# Patient Record
Sex: MALE | Race: WHITE | Employment: UNEMPLOYED | URBAN - NONMETROPOLITAN AREA
[De-identification: names, ages, dates, MRNs, and addresses within clinical notes are randomized per-mention and may not be internally consistent; named-entity substitution may affect disease eponyms.]

---

## 2021-04-19 ENCOUNTER — APPOINTMENT (OUTPATIENT)
Dept: CT IMAGING | Facility: HOSPITAL | Age: 46
End: 2021-04-19
Attending: PHYSICIAN ASSISTANT
Payer: MEDICARE

## 2021-04-19 ENCOUNTER — HOSPITAL ENCOUNTER (EMERGENCY)
Facility: HOSPITAL | Age: 46
Discharge: HOME | End: 2021-04-19
Attending: PHYSICIAN ASSISTANT | Admitting: PHYSICIAN ASSISTANT
Payer: MEDICARE

## 2021-04-19 VITALS
RESPIRATION RATE: 16 BRPM | SYSTOLIC BLOOD PRESSURE: 127 MMHG | HEART RATE: 68 BPM | OXYGEN SATURATION: 97 % | DIASTOLIC BLOOD PRESSURE: 81 MMHG | TEMPERATURE: 98.5 F

## 2021-04-19 DIAGNOSIS — N20.1 URETERAL STONE: Primary | ICD-10-CM

## 2021-04-19 LAB
ALBUMIN SERPL-MCNC: 3.8 G/DL (ref 3.4–5)
ALBUMIN UR-MCNC: 10 MG/DL
ALP SERPL-CCNC: 73 U/L (ref 40–150)
ALT SERPL W P-5'-P-CCNC: 62 U/L (ref 0–70)
ANION GAP SERPL CALCULATED.3IONS-SCNC: 8 MMOL/L (ref 3–14)
APPEARANCE UR: CLEAR
AST SERPL W P-5'-P-CCNC: 21 U/L (ref 0–45)
BACTERIA #/AREA URNS HPF: ABNORMAL /HPF
BASOPHILS # BLD AUTO: 0 10E9/L (ref 0–0.2)
BASOPHILS NFR BLD AUTO: 0.4 %
BILIRUB SERPL-MCNC: 0.6 MG/DL (ref 0.2–1.3)
BILIRUB UR QL STRIP: NEGATIVE
BUN SERPL-MCNC: 17 MG/DL (ref 7–30)
CALCIUM SERPL-MCNC: 8.5 MG/DL (ref 8.5–10.1)
CAOX CRY #/AREA URNS HPF: ABNORMAL /HPF
CHLORIDE SERPL-SCNC: 106 MMOL/L (ref 94–109)
CO2 SERPL-SCNC: 22 MMOL/L (ref 20–32)
COLOR UR AUTO: YELLOW
CREAT SERPL-MCNC: 0.66 MG/DL (ref 0.66–1.25)
DIFFERENTIAL METHOD BLD: NORMAL
EOSINOPHIL # BLD AUTO: 0.1 10E9/L (ref 0–0.7)
EOSINOPHIL NFR BLD AUTO: 1.2 %
ERYTHROCYTE [DISTWIDTH] IN BLOOD BY AUTOMATED COUNT: 12.6 % (ref 10–15)
GFR SERPL CREATININE-BSD FRML MDRD: >90 ML/MIN/{1.73_M2}
GLUCOSE SERPL-MCNC: 193 MG/DL (ref 70–99)
GLUCOSE UR STRIP-MCNC: 300 MG/DL
HCT VFR BLD AUTO: 49.5 % (ref 40–53)
HGB BLD-MCNC: 17.2 G/DL (ref 13.3–17.7)
HGB UR QL STRIP: ABNORMAL
IMM GRANULOCYTES # BLD: 0 10E9/L (ref 0–0.4)
IMM GRANULOCYTES NFR BLD: 0.3 %
KETONES UR STRIP-MCNC: 5 MG/DL
LEUKOCYTE ESTERASE UR QL STRIP: NEGATIVE
LYMPHOCYTES # BLD AUTO: 2.1 10E9/L (ref 0.8–5.3)
LYMPHOCYTES NFR BLD AUTO: 22.3 %
MCH RBC QN AUTO: 29.9 PG (ref 26.5–33)
MCHC RBC AUTO-ENTMCNC: 34.7 G/DL (ref 31.5–36.5)
MCV RBC AUTO: 86 FL (ref 78–100)
MONOCYTES # BLD AUTO: 0.7 10E9/L (ref 0–1.3)
MONOCYTES NFR BLD AUTO: 7 %
MUCOUS THREADS #/AREA URNS LPF: PRESENT /LPF
NEUTROPHILS # BLD AUTO: 6.3 10E9/L (ref 1.6–8.3)
NEUTROPHILS NFR BLD AUTO: 68.8 %
NITRATE UR QL: NEGATIVE
NRBC # BLD AUTO: 0 10*3/UL
NRBC BLD AUTO-RTO: 0 /100
PH UR STRIP: 5.5 PH (ref 4.7–8)
PLATELET # BLD AUTO: 261 10E9/L (ref 150–450)
POTASSIUM SERPL-SCNC: 4.2 MMOL/L (ref 3.4–5.3)
PROT SERPL-MCNC: 7.7 G/DL (ref 6.8–8.8)
RBC # BLD AUTO: 5.76 10E12/L (ref 4.4–5.9)
RBC #/AREA URNS AUTO: 20 /HPF (ref 0–2)
SODIUM SERPL-SCNC: 136 MMOL/L (ref 133–144)
SOURCE: ABNORMAL
SP GR UR STRIP: 1.03 (ref 1–1.03)
SQUAMOUS #/AREA URNS AUTO: 0 /HPF (ref 0–1)
UROBILINOGEN UR STRIP-MCNC: 2 MG/DL (ref 0–2)
WBC # BLD AUTO: 9.2 10E9/L (ref 4–11)
WBC #/AREA URNS AUTO: 3 /HPF (ref 0–5)

## 2021-04-19 PROCEDURE — 99284 EMERGENCY DEPT VISIT MOD MDM: CPT | Performed by: PHYSICIAN ASSISTANT

## 2021-04-19 PROCEDURE — 96372 THER/PROPH/DIAG INJ SC/IM: CPT | Performed by: PHYSICIAN ASSISTANT

## 2021-04-19 PROCEDURE — 36415 COLL VENOUS BLD VENIPUNCTURE: CPT | Performed by: PHYSICIAN ASSISTANT

## 2021-04-19 PROCEDURE — 80053 COMPREHEN METABOLIC PANEL: CPT | Performed by: PHYSICIAN ASSISTANT

## 2021-04-19 PROCEDURE — 74176 CT ABD & PELVIS W/O CONTRAST: CPT

## 2021-04-19 PROCEDURE — 250N000013 HC RX MED GY IP 250 OP 250 PS 637: Performed by: PHYSICIAN ASSISTANT

## 2021-04-19 PROCEDURE — 99285 EMERGENCY DEPT VISIT HI MDM: CPT | Mod: 25

## 2021-04-19 PROCEDURE — 250N000011 HC RX IP 250 OP 636: Performed by: PHYSICIAN ASSISTANT

## 2021-04-19 PROCEDURE — 81001 URINALYSIS AUTO W/SCOPE: CPT | Performed by: PHYSICIAN ASSISTANT

## 2021-04-19 PROCEDURE — 85025 COMPLETE CBC W/AUTO DIFF WBC: CPT | Performed by: PHYSICIAN ASSISTANT

## 2021-04-19 RX ORDER — ICOSAPENT ETHYL 1 G/1
2 CAPSULE ORAL
COMMUNITY
Start: 2021-01-13 | End: 2021-06-15

## 2021-04-19 RX ORDER — IBUPROFEN 800 MG/1
800 TABLET, FILM COATED ORAL
COMMUNITY
Start: 2021-03-13 | End: 2021-06-15

## 2021-04-19 RX ORDER — OXYCODONE AND ACETAMINOPHEN 5; 325 MG/1; MG/1
1 TABLET ORAL EVERY 6 HOURS PRN
Qty: 12 TABLET | Refills: 0 | Status: SHIPPED | OUTPATIENT
Start: 2021-04-19

## 2021-04-19 RX ORDER — SIMVASTATIN 80 MG
80 TABLET ORAL
COMMUNITY
Start: 2021-01-05

## 2021-04-19 RX ORDER — OXYCODONE AND ACETAMINOPHEN 5; 325 MG/1; MG/1
1 TABLET ORAL ONCE
Status: COMPLETED | OUTPATIENT
Start: 2021-04-19 | End: 2021-04-19

## 2021-04-19 RX ORDER — METFORMIN HCL 500 MG
TABLET, EXTENDED RELEASE 24 HR ORAL
COMMUNITY
Start: 2021-03-28

## 2021-04-19 RX ORDER — KETOROLAC TROMETHAMINE 30 MG/ML
60 INJECTION, SOLUTION INTRAMUSCULAR; INTRAVENOUS ONCE
Status: COMPLETED | OUTPATIENT
Start: 2021-04-19 | End: 2021-04-19

## 2021-04-19 RX ORDER — FEXOFENADINE HCL 180 MG/1
180 TABLET ORAL
COMMUNITY
Start: 2020-01-21

## 2021-04-19 RX ORDER — KETOCONAZOLE 20 MG/ML
SHAMPOO TOPICAL
COMMUNITY
Start: 2020-05-12

## 2021-04-19 RX ORDER — NAPROXEN 500 MG/1
500 TABLET ORAL 2 TIMES DAILY PRN
COMMUNITY
Start: 2021-04-01

## 2021-04-19 RX ORDER — MONTELUKAST SODIUM 10 MG/1
TABLET ORAL
COMMUNITY
Start: 2020-11-11

## 2021-04-19 RX ORDER — DEXTROAMPHETAMINE SACCHARATE, AMPHETAMINE ASPARTATE MONOHYDRATE, DEXTROAMPHETAMINE SULFATE AND AMPHETAMINE SULFATE 5; 5; 5; 5 MG/1; MG/1; MG/1; MG/1
20 CAPSULE, EXTENDED RELEASE ORAL
COMMUNITY
Start: 2021-04-16 | End: 2021-07-15

## 2021-04-19 RX ORDER — FLUTICASONE PROPIONATE 50 MCG
SPRAY, SUSPENSION (ML) NASAL
COMMUNITY
Start: 2021-03-28

## 2021-04-19 RX ORDER — LORAZEPAM 0.5 MG/1
0.5 TABLET ORAL
COMMUNITY
Start: 2021-03-15

## 2021-04-19 RX ADMIN — KETOROLAC TROMETHAMINE 60 MG: 30 INJECTION, SOLUTION INTRAMUSCULAR at 21:16

## 2021-04-19 RX ADMIN — OXYCODONE HYDROCHLORIDE AND ACETAMINOPHEN 1 TABLET: 5; 325 TABLET ORAL at 21:15

## 2021-04-19 ASSESSMENT — ENCOUNTER SYMPTOMS
APPETITE CHANGE: 0
SHORTNESS OF BREATH: 0
ABDOMINAL PAIN: 1
VOMITING: 0
HEMATURIA: 1
FLANK PAIN: 1
NAUSEA: 1
CHILLS: 0
BACK PAIN: 0
FEVER: 0
ACTIVITY CHANGE: 0
BRUISES/BLEEDS EASILY: 0

## 2021-04-20 NOTE — DISCHARGE INSTRUCTIONS
Pain medications as prescribed.    Increase your fluids.    Strain your urine.    Follow-up with urology.  I have placed a referral to see Dr. Moon in [place].  You can expect a call from his office in the next few days.    Return here for any fevers, intractable vomiting, intractable pain, or other questions or concerns.

## 2021-04-20 NOTE — ED PROVIDER NOTES
History     Chief Complaint   Patient presents with     Flank Pain     The history is provided by the patient.     Cristian Murrell is a 46 year old male who presented to the emergency department for evaluation of left flank pain.  Symptoms began a few hours ago.  Pain is described as a 9 and colicky.  Denies any nausea, vomiting, hematemesis, hematochezia, or fevers.  Does report hematuria.  Denies any falls.  Denies any blood thinner usage.    Allergies:  Allergies   Allergen Reactions     Fd&C Yellow #10-Methylphenidate      Methylphenidate Other (See Comments)     Other reaction(s): *Unknown         Problem List:    There are no active problems to display for this patient.       Past Medical History:    Past Medical History:   Diagnosis Date     Herpes zoster      Herpes zoster      Keloid scar        Past Surgical History:    Past Surgical History:   Procedure Laterality Date     ARTHROTOMY WRIST      Left wrist     OTHER SURGICAL HISTORY      03/30/2001,[id number],OTHER       Family History:    Family History   Problem Relation Age of Onset     Hyperlipidemia Other         Hyperlipidemia,FHx  Elevated cholesterol     Diabetes Other         Diabetes,FHx DM       Social History:  Marital Status:  Single [1]  Social History     Tobacco Use     Smoking status: Former Smoker     Smokeless tobacco: Never Used   Substance Use Topics     Alcohol use: Yes     Alcohol/week: 0.0 standard drinks     Comment: Alcoholic Drinks/day: occ     Drug use: Unknown     Types: Other     Comment: Drug use: No        Medications:    amphetamine-dextroamphetamine (ADDERALL XR) 20 MG 24 hr capsule  fexofenadine (ALLEGRA) 180 MG tablet  fluticasone (FLONASE) 50 MCG/ACT nasal spray  ibuprofen (ADVIL/MOTRIN) 800 MG tablet  Icosapent Ethyl 1 g CAPS  ketoconazole (NIZORAL) 2 % external shampoo  LORazepam (ATIVAN) 0.5 MG tablet  metFORMIN (GLUCOPHAGE-XR) 500 MG 24 hr tablet  montelukast (SINGULAIR) 10 MG tablet  naproxen (NAPROSYN) 500 MG  tablet  oxyCODONE-acetaminophen (PERCOCET) 5-325 MG tablet  simvastatin (ZOCOR) 80 MG tablet          Review of Systems   Constitutional: Negative for activity change, appetite change, chills and fever.   Respiratory: Negative for shortness of breath.    Cardiovascular: Negative for chest pain.   Gastrointestinal: Positive for abdominal pain and nausea. Negative for vomiting.   Genitourinary: Positive for flank pain and hematuria. Negative for testicular pain.   Musculoskeletal: Negative for back pain.   Skin: Negative.    Allergic/Immunologic: Negative for immunocompromised state.   Hematological: Does not bruise/bleed easily.       Physical Exam   BP: 138/93  Pulse: 82  Temp: 98  F (36.7  C)  Resp: 16  SpO2: 96 %      Physical Exam  Vitals signs and nursing note reviewed.   Constitutional:       General: He is not in acute distress.     Appearance: Normal appearance. He is not ill-appearing, toxic-appearing or diaphoretic.      Comments: Pleasant and talkative 46-year-old male found seated upright on the exam bed in no distress.   Cardiovascular:      Rate and Rhythm: Normal rate and regular rhythm.   Pulmonary:      Effort: Pulmonary effort is normal.   Abdominal:      General: There is no distension.      Palpations: Abdomen is soft.      Tenderness: There is no abdominal tenderness.   Skin:     General: Skin is warm and dry.      Capillary Refill: Capillary refill takes less than 2 seconds.   Neurological:      General: No focal deficit present.      Mental Status: He is alert and oriented to person, place, and time.   Psychiatric:         Mood and Affect: Mood normal.         ED Course        Procedures               Critical Care time:  none               Results for orders placed or performed during the hospital encounter of 04/19/21 (from the past 24 hour(s))   UA reflex to Microscopic   Result Value Ref Range    Color Urine Yellow     Appearance Urine Clear     Glucose Urine 300 (A) NEG^Negative mg/dL     Bilirubin Urine Negative NEG^Negative    Ketones Urine 5 (A) NEG^Negative mg/dL    Specific Gravity Urine 1.031 1.003 - 1.035    Blood Urine Moderate (A) NEG^Negative    pH Urine 5.5 4.7 - 8.0 pH    Protein Albumin Urine 10 (A) NEG^Negative mg/dL    Urobilinogen mg/dL 2.0 0.0 - 2.0 mg/dL    Nitrite Urine Negative NEG^Negative    Leukocyte Esterase Urine Negative NEG^Negative    Source Midstream Urine     RBC Urine 20 (H) 0 - 2 /HPF    WBC Urine 3 0 - 5 /HPF    Bacteria Urine None (A) NEG^Negative /HPF    Squamous Epithelial /HPF Urine 0 0 - 1 /HPF    Mucous Urine Present (A) NEG^Negative /LPF    Calcium Oxalate Many (A) NEG^Negative /HPF   CBC with platelets differential   Result Value Ref Range    WBC 9.2 4.0 - 11.0 10e9/L    RBC Count 5.76 4.4 - 5.9 10e12/L    Hemoglobin 17.2 13.3 - 17.7 g/dL    Hematocrit 49.5 40.0 - 53.0 %    MCV 86 78 - 100 fl    MCH 29.9 26.5 - 33.0 pg    MCHC 34.7 31.5 - 36.5 g/dL    RDW 12.6 10.0 - 15.0 %    Platelet Count 261 150 - 450 10e9/L    Diff Method Automated Method     % Neutrophils 68.8 %    % Lymphocytes 22.3 %    % Monocytes 7.0 %    % Eosinophils 1.2 %    % Basophils 0.4 %    % Immature Granulocytes 0.3 %    Nucleated RBCs 0 0 /100    Absolute Neutrophil 6.3 1.6 - 8.3 10e9/L    Absolute Lymphocytes 2.1 0.8 - 5.3 10e9/L    Absolute Monocytes 0.7 0.0 - 1.3 10e9/L    Absolute Eosinophils 0.1 0.0 - 0.7 10e9/L    Absolute Basophils 0.0 0.0 - 0.2 10e9/L    Abs Immature Granulocytes 0.0 0 - 0.4 10e9/L    Absolute Nucleated RBC 0.0    Comprehensive metabolic panel   Result Value Ref Range    Sodium 136 133 - 144 mmol/L    Potassium 4.2 3.4 - 5.3 mmol/L    Chloride 106 94 - 109 mmol/L    Carbon Dioxide 22 20 - 32 mmol/L    Anion Gap 8 3 - 14 mmol/L    Glucose 193 (H) 70 - 99 mg/dL    Urea Nitrogen 17 7 - 30 mg/dL    Creatinine 0.66 0.66 - 1.25 mg/dL    GFR Estimate >90 >60 mL/min/[1.73_m2]    GFR Estimate If Black >90 >60 mL/min/[1.73_m2]    Calcium 8.5 8.5 - 10.1 mg/dL    Bilirubin  Total 0.6 0.2 - 1.3 mg/dL    Albumin 3.8 3.4 - 5.0 g/dL    Protein Total 7.7 6.8 - 8.8 g/dL    Alkaline Phosphatase 73 40 - 150 U/L    ALT 62 0 - 70 U/L    AST 21 0 - 45 U/L       Medications   oxyCODONE (ROXICODONE) 5 mg 6 tablet ED starter pack 5 mg (5 mg Oral Handoff 4/19/21 2204)   ketorolac (TORADOL) injection 60 mg (60 mg Intramuscular Given 4/19/21 2116)   oxyCODONE-acetaminophen (PERCOCET) 5-325 MG per tablet 1 tablet (1 tablet Oral Given 4/19/21 2115)       Assessments & Plan (with Medical Decision Making)   Findings as above.  No evidence of pyuria or concerns of infection.  Treated in the usual fashion.  I will have him follow with Dr. Marty Moon for further definitive care.  He will be discharged home with urine strainer, oral pain medications, and instructions to return here for intractable vomiting, intractable pain, fevers, or other concerns.  The patient voiced complete understanding and was happy and agreeable.    This document was prepared using a combination of typing and voice generated software.  While every attempt was made for accuracy, spelling and grammatical errors may exist.    I have reviewed the nursing notes.    I have reviewed the findings, diagnosis, plan and need for follow up with the patient.       New Prescriptions    OXYCODONE-ACETAMINOPHEN (PERCOCET) 5-325 MG TABLET    Take 1 tablet by mouth every 6 hours as needed for pain       Final diagnoses:   Ureteral stone       4/19/2021   [facility]     Angelic Rodriguez PA-C  04/19/21 2218

## 2021-04-29 ENCOUNTER — OFFICE VISIT (OUTPATIENT)
Dept: UROLOGY | Facility: OTHER | Age: 46
End: 2021-04-29
Attending: PHYSICIAN ASSISTANT
Payer: MEDICARE

## 2021-04-29 VITALS
DIASTOLIC BLOOD PRESSURE: 70 MMHG | WEIGHT: 243 LBS | SYSTOLIC BLOOD PRESSURE: 122 MMHG | HEART RATE: 85 BPM | BODY MASS INDEX: 36.41 KG/M2 | RESPIRATION RATE: 16 BRPM

## 2021-04-29 DIAGNOSIS — N20.0 KIDNEY STONES: Primary | ICD-10-CM

## 2021-04-29 DIAGNOSIS — N20.1 URETERAL STONE: ICD-10-CM

## 2021-04-29 PROCEDURE — G0463 HOSPITAL OUTPT CLINIC VISIT: HCPCS

## 2021-04-29 PROCEDURE — 99203 OFFICE O/P NEW LOW 30 MIN: CPT | Performed by: UROLOGY

## 2021-04-29 ASSESSMENT — PAIN SCALES - GENERAL: PAINLEVEL: NO PAIN (0)

## 2021-04-29 NOTE — NURSING NOTE
Pt presents to clinic for left kidney stone    Review of Systems:    Weight loss:    No     Recent fever/chills:  No   Night sweats:   No  Current skin rash:  No   Recent hair loss:  No  Heat intolerance:  No   Cold intolerance:  No  Chest pain:   No   Palpitations:   No  Shortness of breath:  No   Wheezing:   No  Constipation:    No   Diarrhea:   No   Nausea:   No   Vomiting:   No   Kidney/side pain:  No   Back pain:   No  Frequent headaches:  No   Dizziness:     No  Leg swelling:   No   Calf pain:    No

## 2021-04-29 NOTE — PROGRESS NOTES
I was asked to see this patient by NIKKI De La Fuente and provide my opinion about the following:  Ureteral stone    Type of Visit  Consult    Chief Complaint  Ureteral stone  History of recurring kidney stones    HPI  Mr. Murrell is a 46 year old male who presents with left  ureteral stone  The patient initially presented to the ED 10 days ago.  At that time the patient underwent a CT scan which revealed a 2 mm obstructing stone.  The patient currently denies fevers or chills.  The patient currently denies nausea or vomiting.  The patient has undergone surgery in the past for stones x 2.  He has had an additional 2 to 3 stone events.  He experienced flank pain for the first 7 days after being seen in the ED.  For the last 3 days he has been pain-free.  Did not see the stone or strain his urine.    In the past he was recommended to collect his urine for metabolic work-up.  He never did collect his urine but voices interest today.      Past Medical History  He  has a past medical history of Herpes zoster, Herpes zoster, and Keloid scar.  There is no problem list on file for this patient.      Past Surgical History  He  has a past surgical history that includes other surgical history and Arthrotomy wrist.    Medications  He has a current medication list which includes the following prescription(s): amphetamine-dextroamphetamine, fexofenadine, fluticasone, ibuprofen, icosapent ethyl, ketoconazole, lorazepam, metformin, montelukast, naproxen, oxycodone-acetaminophen, and simvastatin.    Allergies  Allergies   Allergen Reactions     Fd&C Yellow #10-Methylphenidate      Methylphenidate Other (See Comments)     Other reaction(s): *Unknown         Social History  He  reports that he has quit smoking. He has never used smokeless tobacco. He reports current alcohol use.  No drug abuse.    Family History  Family History   Problem Relation Age of Onset     Hyperlipidemia Other         Hyperlipidemia,FHx  Elevated cholesterol      Diabetes Other         Diabetes,FHx DM       Review of Systems  I personally reviewed the ROS with the patient.    Nursing Notes:   Cammie Vasquez LPN  4/29/2021  1:01 PM  Signed  Pt presents to clinic for left kidney stone    Review of Systems:    Weight loss:    No     Recent fever/chills:  No   Night sweats:   No  Current skin rash:  No   Recent hair loss:  No  Heat intolerance:  No   Cold intolerance:  No  Chest pain:   No   Palpitations:   No  Shortness of breath:  No   Wheezing:   No  Constipation:    No   Diarrhea:   No   Nausea:   No   Vomiting:   No   Kidney/side pain:  No   Back pain:   No  Frequent headaches:  No   Dizziness:     No  Leg swelling:   No   Calf pain:    No            Physical Exam  Vitals:    04/29/21 1259   BP: 122/70   BP Location: Right arm   Patient Position: Sitting   Cuff Size: Adult Large   Pulse: 85   Resp: 16   Weight: 110.2 kg (243 lb)     Constitutional: No acute distress.  Alert and cooperative   Head: NCAT  Eyes: Conjunctivae normal  Cardiovascular: Regular rate.  Pulmonary/Chest: Respirations are even and non-labored bilaterally, no audible wheezing  Abdominal: Soft. No distension, tenderness, masses or guarding.   Neurological: A + O x 3.  Cranial Nerves II-XII grossly intact.  Extremities: BEAU x 4, Warm. No clubbing.  No cyanosis.    Skin: Pink, warm and dry.  No visible rashes noted.  Psychiatric:  Normal mood and affect  Back:  - left CVA tenderness.  - right CVA tenderness.  Genitourinary: nonpalpable bladder    Labs  Results for FIDENCIO SOLOMON (MRN [id number]) as of 4/29/2021 12:51   4/19/2021 19:35 4/19/2021 21:05   Sodium  136   Potassium  4.2   Chloride  106   Carbon Dioxide  22   Urea Nitrogen  17   Creatinine  0.66   GFR Estimate  >90   GFR Estimate If Black  >90   Calcium  8.5   Anion Gap  8   Albumin  3.8   Protein Total  7.7   Bilirubin Total  0.6   Alkaline Phosphatase  73   ALT  62   AST  21   Glucose  193 (H)   WBC  9.2   Hemoglobin  17.2   Hematocrit   49.5   Platelet Count  261   RBC Count  5.76   MCV  86   MCH  29.9   MCHC  34.7   RDW  12.6   Diff Method  Automated Method   % Neutrophils  68.8   % Lymphocytes  22.3   % Monocytes  7.0   % Eosinophils  1.2   % Basophils  0.4   % Immature Granulocytes  0.3   Nucleated RBCs  0   Absolute Neutrophil  6.3   Absolute Lymphocytes  2.1   Absolute Monocytes  0.7   Absolute Eosinophils  0.1   Absolute Basophils  0.0   Abs Immature Granulocytes  0.0   Absolute Nucleated RBC  0.0   Color Urine Yellow    Appearance Urine Clear    Glucose Urine 300 (A)    Bilirubin Urine Negative    Ketones Urine 5 (A)    Specific Gravity Urine 1.031    pH Urine 5.5    Protein Albumin Urine 10 (A)    Urobilinogen mg/dL 2.0    Nitrite Urine Negative    Blood Urine Moderate (A)    Leukocyte Esterase Urine Negative    Source Midstream Urine    WBC Urine 3    RBC Urine 20 (H)    Bacteria Urine None (A)    Squamous Epithelial /HPF Urine 0    Mucous Urine Present (A)    Calcium Oxalate Many (A)      Imaging  CT stone study   4/19/2021  I personally reviewed and interpreted the images and report.  Impression: 2 mm proximal left ureteral calculus, at the ureteropelvic  junction with no significant hydronephrosis .    Assessment  Mr. Murrell is a 46 year old male who presents with symptomatic left  ureteral stone.  Reviewed past notes, labs and imaging.    Discussed risks and benefits of the treatment options for the ureteral stone such as trial of passage, treatment with ureteroscopy or ESWL and the patient elected to proceed with a trial of passage.    Explained to patient to return to ED, or call the urology office if during office hours, if having flu like symptoms, fevers over 101, intractable nausea/vomiting, intractable pain not controlled by pain medications.    He is motivated for stone prevention/metabolic work-up.  Nika generic versus targeted and he would prefer targeted.    Plan  [facility] and follow-up afterwards to discuss the results

## 2021-06-12 ENCOUNTER — APPOINTMENT (OUTPATIENT)
Dept: CT IMAGING | Facility: OTHER | Age: 46
End: 2021-06-12
Attending: PHYSICIAN ASSISTANT
Payer: MEDICARE

## 2021-06-12 ENCOUNTER — HOSPITAL ENCOUNTER (EMERGENCY)
Facility: OTHER | Age: 46
Discharge: HOME | End: 2021-06-12
Attending: PHYSICIAN ASSISTANT | Admitting: PHYSICIAN ASSISTANT
Payer: MEDICARE

## 2021-06-12 VITALS
DIASTOLIC BLOOD PRESSURE: 73 MMHG | BODY MASS INDEX: 34.22 KG/M2 | TEMPERATURE: 99.2 F | HEART RATE: 78 BPM | RESPIRATION RATE: 16 BRPM | WEIGHT: 239 LBS | SYSTOLIC BLOOD PRESSURE: 128 MMHG | OXYGEN SATURATION: 97 % | HEIGHT: 70 IN

## 2021-06-12 DIAGNOSIS — N20.1 LEFT URETERAL STONE: ICD-10-CM

## 2021-06-12 LAB
ALBUMIN SERPL-MCNC: 4.2 G/DL (ref 3.5–5.7)
ALBUMIN UR-MCNC: 10 MG/DL
ALP SERPL-CCNC: 48 U/L (ref 34–104)
ALT SERPL W P-5'-P-CCNC: 41 U/L (ref 7–52)
ANION GAP SERPL CALCULATED.3IONS-SCNC: 10 MMOL/L (ref 3–14)
APPEARANCE UR: CLEAR
AST SERPL W P-5'-P-CCNC: 23 U/L (ref 13–39)
BASOPHILS # BLD AUTO: 0 10E9/L (ref 0–0.2)
BASOPHILS NFR BLD AUTO: 0.2 %
BILIRUB SERPL-MCNC: 0.9 MG/DL (ref 0.3–1)
BILIRUB UR QL STRIP: NEGATIVE
BUN SERPL-MCNC: 16 MG/DL (ref 7–25)
CALCIUM SERPL-MCNC: 9.1 MG/DL (ref 8.6–10.3)
CHLORIDE SERPL-SCNC: 101 MMOL/L (ref 98–107)
CO2 SERPL-SCNC: 25 MMOL/L (ref 21–31)
COLOR UR AUTO: ABNORMAL
CREAT SERPL-MCNC: 1.06 MG/DL (ref 0.7–1.3)
DIFFERENTIAL METHOD BLD: NORMAL
EOSINOPHIL # BLD AUTO: 0.1 10E9/L (ref 0–0.7)
EOSINOPHIL NFR BLD AUTO: 0.8 %
ERYTHROCYTE [DISTWIDTH] IN BLOOD BY AUTOMATED COUNT: 12.1 % (ref 10–15)
GFR SERPL CREATININE-BSD FRML MDRD: 76 ML/MIN/{1.73_M2}
GLUCOSE SERPL-MCNC: 290 MG/DL (ref 70–105)
GLUCOSE UR STRIP-MCNC: >1000 MG/DL
HCT VFR BLD AUTO: 46.9 % (ref 40–53)
HGB BLD-MCNC: 16.7 G/DL (ref 13.3–17.7)
HGB UR QL STRIP: ABNORMAL
IMM GRANULOCYTES # BLD: 0 10E9/L (ref 0–0.4)
IMM GRANULOCYTES NFR BLD: 0.3 %
KETONES UR STRIP-MCNC: 10 MG/DL
LACTATE BLD-SCNC: 2.7 MMOL/L (ref 0.7–2)
LEUKOCYTE ESTERASE UR QL STRIP: NEGATIVE
LIPASE SERPL-CCNC: 25 U/L (ref 11–82)
LYMPHOCYTES # BLD AUTO: 1.3 10E9/L (ref 0.8–5.3)
LYMPHOCYTES NFR BLD AUTO: 15.1 %
MCH RBC QN AUTO: 30.1 PG (ref 26.5–33)
MCHC RBC AUTO-ENTMCNC: 35.6 G/DL (ref 31.5–36.5)
MCV RBC AUTO: 85 FL (ref 78–100)
MONOCYTES # BLD AUTO: 0.6 10E9/L (ref 0–1.3)
MONOCYTES NFR BLD AUTO: 6.7 %
MUCOUS THREADS #/AREA URNS LPF: PRESENT /LPF
NEUTROPHILS # BLD AUTO: 6.7 10E9/L (ref 1.6–8.3)
NEUTROPHILS NFR BLD AUTO: 76.9 %
NITRATE UR QL: NEGATIVE
PH UR STRIP: 5.5 PH (ref 5–7)
PLATELET # BLD AUTO: 228 10E9/L (ref 150–450)
POTASSIUM SERPL-SCNC: 3.7 MMOL/L (ref 3.5–5.1)
PROT SERPL-MCNC: 6.7 G/DL (ref 6.4–8.9)
RBC # BLD AUTO: 5.54 10E12/L (ref 4.4–5.9)
RBC #/AREA URNS AUTO: 6 /HPF (ref 0–2)
SODIUM SERPL-SCNC: 136 MMOL/L (ref 134–144)
SOURCE: ABNORMAL
SP GR UR STRIP: 1.03 (ref 1–1.03)
UROBILINOGEN UR STRIP-MCNC: NORMAL MG/DL (ref 0–2)
WBC # BLD AUTO: 8.7 10E9/L (ref 4–11)
WBC #/AREA URNS AUTO: 1 /HPF (ref 0–5)

## 2021-06-12 PROCEDURE — 80053 COMPREHEN METABOLIC PANEL: CPT | Performed by: PHYSICIAN ASSISTANT

## 2021-06-12 PROCEDURE — 258N000003 HC RX IP 258 OP 636: Performed by: PHYSICIAN ASSISTANT

## 2021-06-12 PROCEDURE — 83605 ASSAY OF LACTIC ACID: CPT | Performed by: PHYSICIAN ASSISTANT

## 2021-06-12 PROCEDURE — 85025 COMPLETE CBC W/AUTO DIFF WBC: CPT | Performed by: PHYSICIAN ASSISTANT

## 2021-06-12 PROCEDURE — 96361 HYDRATE IV INFUSION ADD-ON: CPT | Performed by: PHYSICIAN ASSISTANT

## 2021-06-12 PROCEDURE — 83690 ASSAY OF LIPASE: CPT | Performed by: PHYSICIAN ASSISTANT

## 2021-06-12 PROCEDURE — 36415 COLL VENOUS BLD VENIPUNCTURE: CPT | Performed by: PHYSICIAN ASSISTANT

## 2021-06-12 PROCEDURE — 99285 EMERGENCY DEPT VISIT HI MDM: CPT | Mod: 25 | Performed by: PHYSICIAN ASSISTANT

## 2021-06-12 PROCEDURE — 99283 EMERGENCY DEPT VISIT LOW MDM: CPT | Performed by: PHYSICIAN ASSISTANT

## 2021-06-12 PROCEDURE — 74176 CT ABD & PELVIS W/O CONTRAST: CPT | Mod: TC

## 2021-06-12 PROCEDURE — 250N000011 HC RX IP 250 OP 636: Performed by: PHYSICIAN ASSISTANT

## 2021-06-12 PROCEDURE — 81001 URINALYSIS AUTO W/SCOPE: CPT | Performed by: EMERGENCY MEDICINE

## 2021-06-12 PROCEDURE — 96375 TX/PRO/DX INJ NEW DRUG ADDON: CPT | Performed by: PHYSICIAN ASSISTANT

## 2021-06-12 PROCEDURE — 96374 THER/PROPH/DIAG INJ IV PUSH: CPT | Performed by: PHYSICIAN ASSISTANT

## 2021-06-12 PROCEDURE — 250N000013 HC RX MED GY IP 250 OP 250 PS 637: Mod: GY | Performed by: PHYSICIAN ASSISTANT

## 2021-06-12 RX ORDER — OXYCODONE HYDROCHLORIDE 5 MG/1
5 TABLET ORAL EVERY 6 HOURS PRN
Qty: 12 TABLET | Refills: 0 | Status: SHIPPED | OUTPATIENT
Start: 2021-06-12

## 2021-06-12 RX ORDER — OXYCODONE HYDROCHLORIDE 5 MG/1
5 TABLET ORAL ONCE
Status: COMPLETED | OUTPATIENT
Start: 2021-06-12 | End: 2021-06-12

## 2021-06-12 RX ORDER — TAMSULOSIN HYDROCHLORIDE 0.4 MG/1
0.4 CAPSULE ORAL DAILY
Qty: 10 CAPSULE | Refills: 0 | Status: SHIPPED | OUTPATIENT
Start: 2021-06-12 | End: 2021-06-22

## 2021-06-12 RX ORDER — ONDANSETRON 2 MG/ML
4 INJECTION INTRAMUSCULAR; INTRAVENOUS ONCE
Status: COMPLETED | OUTPATIENT
Start: 2021-06-12 | End: 2021-06-12

## 2021-06-12 RX ORDER — KETOROLAC TROMETHAMINE 15 MG/ML
15 INJECTION, SOLUTION INTRAMUSCULAR; INTRAVENOUS ONCE
Status: COMPLETED | OUTPATIENT
Start: 2021-06-12 | End: 2021-06-12

## 2021-06-12 RX ORDER — ONDANSETRON 4 MG/1
4 TABLET, ORALLY DISINTEGRATING ORAL EVERY 8 HOURS PRN
Qty: 10 TABLET | Refills: 0 | Status: SHIPPED | OUTPATIENT
Start: 2021-06-12 | End: 2021-06-15

## 2021-06-12 RX ORDER — TAMSULOSIN HYDROCHLORIDE 0.4 MG/1
0.4 CAPSULE ORAL DAILY
Status: DISCONTINUED | OUTPATIENT
Start: 2021-06-12 | End: 2021-06-12 | Stop reason: HOSPADM

## 2021-06-12 RX ADMIN — KETOROLAC TROMETHAMINE 15 MG: 15 INJECTION, SOLUTION INTRAMUSCULAR; INTRAVENOUS at 17:04

## 2021-06-12 RX ADMIN — OXYCODONE HYDROCHLORIDE 5 MG: 5 TABLET ORAL at 19:53

## 2021-06-12 RX ADMIN — SODIUM CHLORIDE 1000 ML: 9 INJECTION, SOLUTION INTRAVENOUS at 18:11

## 2021-06-12 RX ADMIN — TAMSULOSIN HYDROCHLORIDE 0.4 MG: 0.4 CAPSULE ORAL at 19:53

## 2021-06-12 RX ADMIN — SODIUM CHLORIDE 1000 ML: 9 INJECTION, SOLUTION INTRAVENOUS at 17:04

## 2021-06-12 RX ADMIN — ONDANSETRON 4 MG: 2 INJECTION INTRAMUSCULAR; INTRAVENOUS at 19:54

## 2021-06-12 ASSESSMENT — ENCOUNTER SYMPTOMS
HEMATURIA: 0
BRUISES/BLEEDS EASILY: 0
ABDOMINAL PAIN: 0
SHORTNESS OF BREATH: 0
FEVER: 0
BACK PAIN: 0
ADENOPATHY: 0
FLANK PAIN: 1
WOUND: 0
CHILLS: 0
CHEST TIGHTNESS: 0
CONFUSION: 0

## 2021-06-12 ASSESSMENT — MIFFLIN-ST. JEOR: SCORE: 1980.35

## 2021-06-12 NOTE — ED TRIAGE NOTES
ED Nursing Triage Note (General)   ________________________________    Cristian Murrell is a 46 year old Male that presents to triage private car  With history of  L back and flank pain like his kidney stones reported by patient   Significant symptoms had onset 2 hours ago.    Patient appears alert  and oriented, in mild distress., and cooperative, pleasant and calm behavior.    GCS 15  Airway: intact  Breathing noted as Normal  Circulation Normal  Skin:  Normal  Action taken:  Instructed re UA      PRE HOSPITAL PRIOR LIVING SITUATION Parents/Siblings

## 2021-06-12 NOTE — ED PROVIDER NOTES
History     Chief Complaint   Patient presents with     Back Pain     Flank Pain     HPI  Cristian Murrell is a 46 year old male who presents to the ED for evaluation of some left flank pain.  This started within the last few hours.  He has a long history of kidney stones and says this feels similar.  He has not had to have any of his stone surgically removed before.  He has had slight nausea but without vomiting, in general he says his pain is controlled but does rated an 8 out of 10.  He denies any dysuria, fevers, chest pain, shortness of breath, diarrhea.    Allergies:  Allergies   Allergen Reactions     Fd&C Yellow #10-Methylphenidate      Methylphenidate Other (See Comments)     Other reaction(s): *Unknown         Problem List:    There are no active problems to display for this patient.       Past Medical History:    Past Medical History:   Diagnosis Date     Herpes zoster      Herpes zoster      Keloid scar        Past Surgical History:    Past Surgical History:   Procedure Laterality Date     ARTHROTOMY WRIST      Left wrist     OTHER SURGICAL HISTORY      03/30/2001,[id number],OTHER       Family History:    Family History   Problem Relation Age of Onset     Hyperlipidemia Other         Hyperlipidemia,FHx  Elevated cholesterol     Diabetes Other         Diabetes,FHx DM       Social History:  Marital Status:  Single [1]  Social History     Tobacco Use     Smoking status: Former Smoker     Smokeless tobacco: Never Used   Substance Use Topics     Alcohol use: Yes     Alcohol/week: 0.0 standard drinks     Comment: Alcoholic Drinks/day: occ     Drug use: Unknown     Types: Other     Comment: Drug use: No        Medications:    amphetamine-dextroamphetamine (ADDERALL XR) 20 MG 24 hr capsule  fexofenadine (ALLEGRA) 180 MG tablet  fluticasone (FLONASE) 50 MCG/ACT nasal spray  ibuprofen (ADVIL/MOTRIN) 800 MG tablet  Icosapent Ethyl 1 g CAPS  ketoconazole (NIZORAL) 2 % external shampoo  LORazepam (ATIVAN) 0.5 MG  "tablet  metFORMIN (GLUCOPHAGE-XR) 500 MG 24 hr tablet  montelukast (SINGULAIR) 10 MG tablet  naproxen (NAPROSYN) 500 MG tablet  ondansetron (ZOFRAN ODT) 4 MG ODT tab  oxyCODONE (ROXICODONE) 5 MG tablet  oxyCODONE-acetaminophen (PERCOCET) 5-325 MG tablet  simvastatin (ZOCOR) 80 MG tablet  tamsulosin (FLOMAX) 0.4 MG capsule          Review of Systems   Constitutional: Negative for chills and fever.   HENT: Negative for congestion.    Eyes: Negative for visual disturbance.   Respiratory: Negative for chest tightness and shortness of breath.    Cardiovascular: Negative for chest pain.   Gastrointestinal: Negative for abdominal pain.   Genitourinary: Positive for flank pain. Negative for hematuria.   Musculoskeletal: Negative for back pain.   Skin: Negative for rash and wound.   Neurological: Negative for syncope.   Hematological: Negative for adenopathy. Does not bruise/bleed easily.   Psychiatric/Behavioral: Negative for confusion.       Physical Exam   BP: 131/80  Pulse: 91  Temp: 99.2  F (37.3  C)  Resp: 18  Height: 177.8 cm (5' 10\")  Weight: 108.4 kg (239 lb)  SpO2: 98 %      Physical Exam  Constitutional:       General: He is not in acute distress.     Appearance: He is well-developed. He is not diaphoretic.   HENT:      Head: Normocephalic and atraumatic.   Eyes:      General: No scleral icterus.     Conjunctiva/sclera: Conjunctivae normal.   Neck:      Musculoskeletal: Neck supple.   Cardiovascular:      Rate and Rhythm: Normal rate and regular rhythm.   Pulmonary:      Effort: Pulmonary effort is normal.      Breath sounds: Normal breath sounds.   Abdominal:      Palpations: Abdomen is soft.      Tenderness: There is no abdominal tenderness. There is left CVA tenderness.   Musculoskeletal:         General: No deformity.   Lymphadenopathy:      Cervical: No cervical adenopathy.   Skin:     General: Skin is warm and dry.      Findings: No rash.   Neurological:      Mental Status: He is alert and oriented to " person, place, and time. Mental status is at baseline.   Psychiatric:         Mood and Affect: Mood normal.         Behavior: Behavior normal.         Thought Content: Thought content normal.         Judgment: Judgment normal.         ED Course        Procedures               Critical Care time:  none            Results for orders placed or performed during the hospital encounter of 06/12/21 (from the past 24 hour(s))   UA reflex to Microscopic   Result Value Ref Range    Color Urine Light Yellow     Appearance Urine Clear     Glucose Urine >1000 (A) NEG^Negative mg/dL    Bilirubin Urine Negative NEG^Negative    Ketones Urine 10 (A) NEG^Negative mg/dL    Specific Gravity Urine 1.027 1.003 - 1.035    Blood Urine Small (A) NEG^Negative    pH Urine 5.5 5.0 - 7.0 pH    Protein Albumin Urine 10 (A) NEG^Negative mg/dL    Urobilinogen mg/dL Normal 0.0 - 2.0 mg/dL    Nitrite Urine Negative NEG^Negative    Leukocyte Esterase Urine Negative NEG^Negative    Source Midstream Urine     RBC Urine 6 (H) 0 - 2 /HPF    WBC Urine 1 0 - 5 /HPF    Mucous Urine Present (A) NEG^Negative /LPF   CBC with platelets differential   Result Value Ref Range    WBC 8.7 4.0 - 11.0 10e9/L    RBC Count 5.54 4.4 - 5.9 10e12/L    Hemoglobin 16.7 13.3 - 17.7 g/dL    Hematocrit 46.9 40.0 - 53.0 %    MCV 85 78 - 100 fl    MCH 30.1 26.5 - 33.0 pg    MCHC 35.6 31.5 - 36.5 g/dL    RDW 12.1 10.0 - 15.0 %    Platelet Count 228 150 - 450 10e9/L    Diff Method Automated Method     % Neutrophils 76.9 %    % Lymphocytes 15.1 %    % Monocytes 6.7 %    % Eosinophils 0.8 %    % Basophils 0.2 %    % Immature Granulocytes 0.3 %    Absolute Neutrophil 6.7 1.6 - 8.3 10e9/L    Absolute Lymphocytes 1.3 0.8 - 5.3 10e9/L    Absolute Monocytes 0.6 0.0 - 1.3 10e9/L    Absolute Eosinophils 0.1 0.0 - 0.7 10e9/L    Absolute Basophils 0.0 0.0 - 0.2 10e9/L    Abs Immature Granulocytes 0.0 0 - 0.4 10e9/L   Comprehensive metabolic panel   Result Value Ref Range    Sodium 136 134 -  144 mmol/L    Potassium 3.7 3.5 - 5.1 mmol/L    Chloride 101 98 - 107 mmol/L    Carbon Dioxide 25 21 - 31 mmol/L    Anion Gap 10 3 - 14 mmol/L    Glucose 290 (H) 70 - 105 mg/dL    Urea Nitrogen 16 7 - 25 mg/dL    Creatinine 1.06 0.70 - 1.30 mg/dL    GFR Estimate 76 >60 mL/min/[1.73_m2]    GFR Estimate If Black >90 >60 mL/min/[1.73_m2]    Calcium 9.1 8.6 - 10.3 mg/dL    Bilirubin Total 0.9 0.3 - 1.0 mg/dL    Albumin 4.2 3.5 - 5.7 g/dL    Protein Total 6.7 6.4 - 8.9 g/dL    Alkaline Phosphatase 48 34 - 104 U/L    ALT 41 7 - 52 U/L    AST 23 13 - 39 U/L   Lipase   Result Value Ref Range    Lipase 25 11 - 82 U/L   Lactic acid whole blood   Result Value Ref Range    Lactic Acid 2.7 (H) 0.7 - 2.0 mmol/L   CT Abdomen Pelvis w/o Contrast    Narrative    PROCEDURE INFORMATION:   Exam: CT Abdomen And Pelvis Without Contrast   Exam date and time: 6/12/2021 6:16 PM   Age: 46 years old   Clinical indication: Abdominal pain; Lower abdomen; Patient HX: History of L   back and flank pain like his kidney stones reported by patient     TECHNIQUE:   Imaging protocol: Computed tomography of the abdomen and pelvis without   contrast.   Radiation optimization: All CT scans at this facility use at least one of these   dose optimization techniques: automated exposure control; mA and/or kV   adjustment per patient size (includes targeted exams where dose is matched to   clinical indication); or iterative reconstruction.     COMPARISON:   No relevant prior studies available.     FINDINGS:   Lungs: The visualized lung bases are clear.     Liver: Severe generalized hepatic steatosis.   Gallbladder and bile ducts: Normal. No calcified stones. No ductal dilation.   Pancreas: Normal. No ductal dilation.   Spleen: Normal. No splenomegaly.   Adrenal glands: Normal. No mass.   Kidneys and ureters: Mild left hydroureteronephrosis due to an obstructing   distal 2 mm left ureteric calculus approximately 1.3 cm from the   ureterovesicular junction. No  renal, right ureteric, or layering urinary   bladder calculus.   Stomach and bowel: Unremarkable. No obstruction. No mucosal thickening.   Appendix: Normal appendix. No appendicitis.     Intraperitoneal space: Unremarkable. No free air. No significant fluid   collection.   Vasculature: Unremarkable. No abdominal aortic aneurysm.   Lymph nodes: Unremarkable. No enlarged lymph nodes.   Urinary bladder: Unremarkable as visualized.   Reproductive: Unremarkable as visualized.   Bones/joints: Unremarkable. No acute fracture.   Soft tissues: Small fat containing left inguinal hernia without evidence for   bowel involvement.       Impression    IMPRESSION:   Mild left hydroureteronephrosis due to an obstructing distal 2 mm left ureteric   calculus approximately 1.3 cm from the ureterovesicular junction.     THIS DOCUMENT HAS BEEN ELECTRONICALLY SIGNED BY FRITZ HENRY MD       Medications   ketorolac (TORADOL) injection 15 mg (15 mg Intravenous Given 6/12/21 1704)   0.9% sodium chloride BOLUS (0 mLs Intravenous Stopped 6/12/21 1800)   0.9% sodium chloride BOLUS (0 mLs Intravenous Stopped 6/12/21 1955)   oxyCODONE (ROXICODONE) tablet 5 mg (5 mg Oral Given 6/12/21 1953)   ondansetron (ZOFRAN) injection 4 mg (4 mg Intravenous Given 6/12/21 1954)       Assessments & Plan (with Medical Decision Making)   Nontoxic in no acute distress.  Heart, lung, bowel sounds are normal.  Abdomen.  Soft nontender palpation.  Some left-sided flank pain.  Vital signs are stable he is afebrile.    He has no leukocytosis, normal hemoglobin, lactic acid is 2.7, CMP remarkable for elevated sugar of 290 with normal kidney functions.  Urinalysis shows small blood without signs of infection.    Given fluids and Toradol.    CT abdomen read as:  Mild left hydroureteronephrosis due to an obstructing distal 2 mm left ureteric   calculus approximately 1.3 cm from the ureterovesicular junction.     Overall, he appears to be doing well. We will send him home  with pain meds, zofran, flomax. Referral placed to f/u with urology.     Strict return precautions are given to the pt, they will return if symptoms are worsening or concerning. The pt understands and agrees with the plan and they are discharged.     Efrain Rivera PA-C    I have reviewed the nursing notes.    I have reviewed the findings, diagnosis, plan and need for follow up with the patient.       Discharge Medication List as of 6/12/2021  7:58 PM      START taking these medications    Details   ondansetron (ZOFRAN ODT) 4 MG ODT tab Take 1 tablet (4 mg) by mouth every 8 hours as needed, Disp-10 tablet, R-0, E-Prescribe      oxyCODONE (ROXICODONE) 5 MG tablet Take 1 tablet (5 mg) by mouth every 6 hours as needed, Disp-12 tablet, R-0, E-Prescribe      tamsulosin (FLOMAX) 0.4 MG capsule Take 1 capsule (0.4 mg) by mouth daily for 10 doses, Disp-10 capsule, R-0, E-Prescribe             Final diagnoses:   Left ureteral stone       6/12/2021   [facility] AND Efrain Sharp PA  06/13/21 1124

## 2021-06-13 NOTE — DISCHARGE INSTRUCTIONS
Get plenty of fluids and rest.  Take your medications as directed.  We will provide you a strainer where you can try to collect your stone.  Referrals placed for you to follow-up with Dr. Moon with urology for reassessment.  Seems likely that she may pass the stone on your own.  However, you should return the ED if there is intractable pain, intractable vomiting, increased fevers or painful urination.

## 2021-06-13 NOTE — ED NOTES
Patient resting in bed. States his pain is a 3 out of 10 and much more manageable. Denies n/v. No other concerns at this time.

## 2021-06-15 ENCOUNTER — HOSPITAL ENCOUNTER (EMERGENCY)
Facility: HOSPITAL | Age: 46
Discharge: HOME | End: 2021-06-15
Attending: PHYSICIAN ASSISTANT | Admitting: PHYSICIAN ASSISTANT
Payer: MEDICARE

## 2021-06-15 VITALS
SYSTOLIC BLOOD PRESSURE: 149 MMHG | RESPIRATION RATE: 16 BRPM | HEART RATE: 70 BPM | OXYGEN SATURATION: 98 % | DIASTOLIC BLOOD PRESSURE: 99 MMHG | TEMPERATURE: 98 F

## 2021-06-15 DIAGNOSIS — N20.1 URETERAL STONE: ICD-10-CM

## 2021-06-15 PROCEDURE — 96372 THER/PROPH/DIAG INJ SC/IM: CPT | Performed by: PHYSICIAN ASSISTANT

## 2021-06-15 PROCEDURE — 250N000011 HC RX IP 250 OP 636: Performed by: PHYSICIAN ASSISTANT

## 2021-06-15 PROCEDURE — 99283 EMERGENCY DEPT VISIT LOW MDM: CPT | Performed by: PHYSICIAN ASSISTANT

## 2021-06-15 PROCEDURE — 99284 EMERGENCY DEPT VISIT MOD MDM: CPT | Mod: 25

## 2021-06-15 RX ORDER — KETOROLAC TROMETHAMINE 30 MG/ML
60 INJECTION, SOLUTION INTRAMUSCULAR; INTRAVENOUS ONCE
Status: COMPLETED | OUTPATIENT
Start: 2021-06-15 | End: 2021-06-15

## 2021-06-15 RX ORDER — IBUPROFEN 800 MG/1
800 TABLET, FILM COATED ORAL EVERY 8 HOURS PRN
Qty: 30 TABLET | Refills: 0 | Status: SHIPPED | OUTPATIENT
Start: 2021-06-15 | End: 2021-06-15

## 2021-06-15 RX ORDER — KETOROLAC TROMETHAMINE 10 MG/1
10 TABLET, FILM COATED ORAL EVERY 6 HOURS PRN
Qty: 20 TABLET | Refills: 0 | Status: SHIPPED | OUTPATIENT
Start: 2021-06-15

## 2021-06-15 RX ADMIN — KETOROLAC TROMETHAMINE 60 MG: 30 INJECTION, SOLUTION INTRAMUSCULAR at 18:08

## 2021-06-15 ASSESSMENT — ENCOUNTER SYMPTOMS
DIFFICULTY URINATING: 0
CHILLS: 0
NAUSEA: 1
VOMITING: 0
ABDOMINAL PAIN: 1
FEVER: 0
FLANK PAIN: 1

## 2021-06-15 NOTE — ED PROVIDER NOTES
History     Chief Complaint   Patient presents with     Kidney Problem     The history is provided by the patient.     Cristian Murrell is a 46 year old male who presented to the emergency department ambulatory for evaluation of persistent left lower quadrant as well as left flank pain.  Diagnosed with a 2 mm distal ureteral stone on June 12 at [facility].  He is scheduled to see Dr. Marty Moon of urology tomorrow.  Pain did not improve after his recent Percocet.  No fevers or chills.  He has a history of recurrent stones.    Allergies:  Allergies   Allergen Reactions     Fd&C Yellow #10-Methylphenidate      Methylphenidate Other (See Comments)     Other reaction(s): *Unknown         Problem List:    There are no active problems to display for this patient.       Past Medical History:    Past Medical History:   Diagnosis Date     Herpes zoster      Herpes zoster      Keloid scar        Past Surgical History:    Past Surgical History:   Procedure Laterality Date     ARTHROTOMY WRIST      Left wrist     OTHER SURGICAL HISTORY      03/30/2001,[id number],OTHER       Family History:    Family History   Problem Relation Age of Onset     Hyperlipidemia Other         Hyperlipidemia,FHx  Elevated cholesterol     Diabetes Other         Diabetes,FHx DM       Social History:  Marital Status:  Single [1]  Social History     Tobacco Use     Smoking status: Former Smoker     Smokeless tobacco: Never Used   Substance Use Topics     Alcohol use: Yes     Alcohol/week: 0.0 standard drinks     Comment: Alcoholic Drinks/day: occ     Drug use: Unknown     Types: Other     Comment: Drug use: No        Medications:    amphetamine-dextroamphetamine (ADDERALL XR) 20 MG 24 hr capsule  fexofenadine (ALLEGRA) 180 MG tablet  fluticasone (FLONASE) 50 MCG/ACT nasal spray  ketoconazole (NIZORAL) 2 % external shampoo  ketorolac (TORADOL) 10 MG tablet  LORazepam (ATIVAN) 0.5 MG tablet  metFORMIN (GLUCOPHAGE-XR) 500 MG  24 hr tablet  montelukast (SINGULAIR) 10 MG tablet  naproxen (NAPROSYN) 500 MG tablet  ondansetron (ZOFRAN ODT) 4 MG ODT tab  oxyCODONE (ROXICODONE) 5 MG tablet  oxyCODONE-acetaminophen (PERCOCET) 5-325 MG tablet  simvastatin (ZOCOR) 80 MG tablet  tamsulosin (FLOMAX) 0.4 MG capsule          Review of Systems   Constitutional: Negative for chills and fever.   Gastrointestinal: Positive for abdominal pain and nausea. Negative for vomiting.   Genitourinary: Positive for flank pain. Negative for difficulty urinating.   Skin: Negative.        Physical Exam   BP: 149/99  Pulse: 70  Temp: 98  F (36.7  C)  Resp: 16  SpO2: 98 %      Physical Exam  Vitals signs and nursing note reviewed.   Constitutional:       General: He is not in acute distress.     Appearance: He is not ill-appearing, toxic-appearing or diaphoretic.      Comments: Pleasant and talkative 46-year-old male found seated upright on the edge of the exam bed in no distress.   Cardiovascular:      Rate and Rhythm: Normal rate and regular rhythm.   Pulmonary:      Effort: Pulmonary effort is normal.   Abdominal:      Palpations: Abdomen is soft.      Tenderness: There is no abdominal tenderness.   Skin:     General: Skin is warm and dry.      Capillary Refill: Capillary refill takes less than 2 seconds.   Neurological:      General: No focal deficit present.      Mental Status: He is alert and oriented to person, place, and time.   Psychiatric:         Mood and Affect: Mood normal.         ED Course        Procedures               Critical Care time:  none               No results found for this or any previous visit (from the past 24 hour(s)).    Medications   ketorolac (TORADOL) injection 60 mg (60 mg Intramuscular Given 6/15/21 1808)       Assessments & Plan (with Medical Decision Making)   46-year-old male with a history of a distal left ureteral stone who presents with recurrent pain.  No reasonable indication for repeat laboratory evaluation or imaging.  He  has no high risk or red flag signs or symptoms.  Pain improved nicely with IM Toradol.  Toradol for home.  Follow-up with urology tomorrow.  Return here as needed.    This document was prepared using a combination of typing and voice generated software.  While every attempt was made for accuracy, spelling and grammatical errors may exist.    I have reviewed the nursing notes.    I have reviewed the findings, diagnosis, plan and need for follow up with the patient.       New Prescriptions    KETOROLAC (TORADOL) 10 MG TABLET    Take 1 tablet (10 mg) by mouth every 6 hours as needed for moderate pain       Final diagnoses:   Ureteral stone       6/15/2021   [facility]     Angelic Rodriguez PA-C  06/15/21 3904

## 2021-06-15 NOTE — ED NOTES
Discharge instructions reviewed with patient and understanding verbalized. Rx sent to [facility] in [place]. Ambulatory with a steady gait to the exit accompanied by mother.

## 2021-06-15 NOTE — ED NOTES
C/o the inability to control his left sided flank pain after being dx with a 2mm kidney stones on Sunday at [facility]. Was able to control the pain yesterday without pain medication. Took a 5mg oxycodone at 7am today with relief, took another 5mg oxycodone at 1200 without relief and has not taken one since. Denies difficulty with urine stream. No n/v or fevers. Has an appt with Dr. Moon tomorrow (6/16). Main concern is pain control.

## 2021-06-16 ENCOUNTER — OFFICE VISIT (OUTPATIENT)
Dept: UROLOGY | Facility: OTHER | Age: 46
End: 2021-06-16
Attending: PHYSICIAN ASSISTANT
Payer: MEDICARE

## 2021-06-16 VITALS
OXYGEN SATURATION: 97 % | WEIGHT: 239 LBS | HEART RATE: 78 BPM | RESPIRATION RATE: 16 BRPM | BODY MASS INDEX: 34.29 KG/M2 | SYSTOLIC BLOOD PRESSURE: 124 MMHG | DIASTOLIC BLOOD PRESSURE: 72 MMHG

## 2021-06-16 DIAGNOSIS — N20.1 LEFT URETERAL STONE: ICD-10-CM

## 2021-06-16 PROCEDURE — G0463 HOSPITAL OUTPT CLINIC VISIT: HCPCS

## 2021-06-16 PROCEDURE — 99214 OFFICE O/P EST MOD 30 MIN: CPT | Performed by: UROLOGY

## 2021-06-16 RX ORDER — HYDROCODONE BITARTRATE AND ACETAMINOPHEN 5; 325 MG/1; MG/1
1-2 TABLET ORAL EVERY 4 HOURS PRN
Qty: 10 TABLET | Refills: 0 | Status: SHIPPED | OUTPATIENT
Start: 2021-06-16

## 2021-06-16 ASSESSMENT — PAIN SCALES - GENERAL: PAINLEVEL: MILD PAIN (3)

## 2021-06-16 NOTE — NURSING NOTE
Chief Complaint   Patient presents with     Follow Up     left uteral stone    Patient presents to the clinic today for a follow up for Left ureteral stone    Review of Systems:    Weight loss:    No     Recent fever/chills:  No   Night sweats:   No  Current skin rash:  No   Recent hair loss:  No  Heat intolerance:  No   Cold intolerance:  No  Chest pain:   No   Palpitations:   No  Shortness of breath:  No   Wheezing:   No  Constipation:    No   Diarrhea:   No   Nausea:   No   Vomiting:   No   Kidney/side pain:  Yes   Back pain:   No  Frequent headaches:  No   Dizziness:     No  Leg swelling:   No   Calf pain:    No      Medication Reconciliation: completed   Kaya Mena LPN  6/16/2021 2:25 PM

## 2021-06-16 NOTE — PROGRESS NOTES
Type of Visit  EST    Chief Complaint  Ureteral stone  History of recurring kidney stones    HPI  Mr. Murrell is a 46 year old male who follows up after a second visit to the emergency department in the past 2 months for left-sided flank pain.  In April the patient was seen in the emergency department with left flank pain and a CT scan revealed a proximal 2 mm stone.  The patient's pain resolved within a few days.  The patient represented to the emergency room yesterday with recurrent left flank pain.  At that time a CT scan revealed a distal small punctate stone that appeared to be obstructing near the UVJ.  The patient is currently doing well in regards to pain.      Review of Systems  I personally reviewed the ROS with the patient.    Nursing Notes:   Kaya Mena LPN  6/16/2021  2:29 PM  Addendum  Chief Complaint   Patient presents with     Follow Up     left uteral stone    Patient presents to the clinic today for a follow up for Left ureteral stone    Review of Systems:    Weight loss:    No     Recent fever/chills:  No   Night sweats:   No  Current skin rash:  No   Recent hair loss:  No  Heat intolerance:  No   Cold intolerance:  No  Chest pain:   No   Palpitations:   No  Shortness of breath:  No   Wheezing:   No  Constipation:    No   Diarrhea:   No   Nausea:   No   Vomiting:   No   Kidney/side pain:  Yes   Back pain:   No  Frequent headaches:  No   Dizziness:     No  Leg swelling:   No   Calf pain:    No      Medication Reconciliation: completed   Kaya Mena LPN  6/16/2021 2:25 PM       Physical Exam  Vitals:    06/16/21 1428   BP: 124/72   BP Location: Right arm   Patient Position: Sitting   Cuff Size: Adult Large   Pulse: 78   Resp: 16   SpO2: 97%   Weight: 108.4 kg (239 lb)   Constitutional: No acute distress.  Alert and cooperative   Head: NCAT  Eyes: Conjunctivae normal  Cardiovascular: Regular rate.  Pulmonary/Chest: Respirations are even and non-labored bilaterally, no audible  wheezing  Abdominal: Soft. No distension, tenderness, masses or guarding.   Neurological: A + O x 3.  Cranial Nerves II-XII grossly intact.  Extremities: BEAU x 4, Warm. No clubbing.  No cyanosis.    Skin: Pink, warm and dry.  No visible rashes noted.  Psychiatric:  Normal mood and affect  Back:  - left CVA tenderness.  - right CVA tenderness.  Genitourinary: nonpalpable bladder    Labs  Results for FIDENCIO SOLOMON (MRN [id number]) as of 6/16/2021 14:42   6/12/2021 16:56   Sodium 136   Potassium 3.7   Chloride 101   Carbon Dioxide 25   Urea Nitrogen 16   Creatinine 1.06   GFR Estimate 76   GFR Estimate If Black >90   Calcium 9.1   Anion Gap 10   Albumin 4.2   Protein Total 6.7   Bilirubin Total 0.9   Alkaline Phosphatase 48   ALT 41   AST 23   Lipase 25   Glucose 290 (H)   WBC 8.7   Hemoglobin 16.7   Hematocrit 46.9   Platelet Count 228   RBC Count 5.54   MCV 85   MCH 30.1   MCHC 35.6   RDW 12.1   Diff Method Automated Method   % Neutrophils 76.9   % Lymphocytes 15.1   % Monocytes 6.7   % Eosinophils 0.8   % Basophils 0.2   % Immature Granulocytes 0.3   Absolute Neutrophil 6.7   Absolute Lymphocytes 1.3   Absolute Monocytes 0.6   Absolute Eosinophils 0.1   Absolute Basophils 0.0   Abs Immature Granulocytes 0.0       Results for FIDENCIO SOLOMON (MRN [id number]) as of 4/29/2021 12:51   4/19/2021 19:35 4/19/2021 21:05   Sodium  136   Potassium  4.2   Chloride  106   Carbon Dioxide  22   Urea Nitrogen  17   Creatinine  0.66   GFR Estimate  >90   GFR Estimate If Black  >90   Calcium  8.5   Anion Gap  8   Albumin  3.8   Protein Total  7.7   Bilirubin Total  0.6   Alkaline Phosphatase  73   ALT  62   AST  21   Glucose  193 (H)   WBC  9.2   Hemoglobin  17.2   Hematocrit  49.5   Platelet Count  261   RBC Count  5.76   MCV  86   MCH  29.9   MCHC  34.7   RDW  12.6   Diff Method  Automated Method   % Neutrophils  68.8   % Lymphocytes  22.3   % Monocytes  7.0   % Eosinophils  1.2   % Basophils  0.4   % Immature Granulocytes  0.3    Nucleated RBCs  0   Absolute Neutrophil  6.3   Absolute Lymphocytes  2.1   Absolute Monocytes  0.7   Absolute Eosinophils  0.1   Absolute Basophils  0.0   Abs Immature Granulocytes  0.0   Absolute Nucleated RBC  0.0   Color Urine Yellow    Appearance Urine Clear    Glucose Urine 300 (A)    Bilirubin Urine Negative    Ketones Urine 5 (A)    Specific Gravity Urine 1.031    pH Urine 5.5    Protein Albumin Urine 10 (A)    Urobilinogen mg/dL 2.0    Nitrite Urine Negative    Blood Urine Moderate (A)    Leukocyte Esterase Urine Negative    Source Midstream Urine    WBC Urine 3    RBC Urine 20 (H)    Bacteria Urine None (A)    Squamous Epithelial /HPF Urine 0    Mucous Urine Present (A)    Calcium Oxalate Many (A)      Imaging  CT Stone Study   6/12/2021  IMPRESSION:   Mild left hydroureteronephrosis due to an obstructing distal 2 mm left ureteric   calculus approximately 1.3 cm from the ureterovesicular junction.     ^^^^^^^^^^^^^^^^^^^^^^^^^^^^^^^^^^^^^^^^^^^    CT Stone Study  4/19/2021  I personally reviewed and interpreted the images and report.  Impression: 2 mm proximal left ureteral calculus, at the ureteropelvic  junction with no significant hydronephrosis .    Assessment & Plan  Mr. Murrell is a 46 year old male who presents with symptomatic left  ureteral stone.  Reviewed past notes, labs and imaging.    Discussed risks and benefits of the treatment options for the ureteral stone such as trial of passage, treatment with ureteroscopy or ESWL and the patient elected to proceed with a trial of passage.    Explained to patient to return to ED, or call the urology office if during office hours, if having flu like symptoms, fevers over 101, intractable nausea/vomiting, intractable pain not controlled by pain medications.              A total of 30 minutes was spent with the patient, reviewing records, tests, ordering medications, tests or procedures, counseling regarding the above described medical concern,  recommendations regarding management and documenting clinical information in the EHR.

## 2021-07-01 ENCOUNTER — OFFICE VISIT (OUTPATIENT)
Dept: UROLOGY | Facility: OTHER | Age: 46
End: 2021-07-01
Attending: UROLOGY
Payer: MEDICARE

## 2021-07-01 VITALS
BODY MASS INDEX: 34.78 KG/M2 | WEIGHT: 242.4 LBS | DIASTOLIC BLOOD PRESSURE: 82 MMHG | HEART RATE: 96 BPM | RESPIRATION RATE: 16 BRPM | SYSTOLIC BLOOD PRESSURE: 130 MMHG | OXYGEN SATURATION: 96 %

## 2021-07-01 DIAGNOSIS — N20.1 LEFT URETERAL STONE: Primary | ICD-10-CM

## 2021-07-01 PROCEDURE — G0463 HOSPITAL OUTPT CLINIC VISIT: HCPCS

## 2021-07-01 PROCEDURE — 99213 OFFICE O/P EST LOW 20 MIN: CPT | Performed by: UROLOGY

## 2021-07-01 RX ORDER — HYDROCODONE BITARTRATE AND ACETAMINOPHEN 5; 325 MG/1; MG/1
1 TABLET ORAL EVERY 4 HOURS PRN
Qty: 10 TABLET | Refills: 0 | Status: SHIPPED | OUTPATIENT
Start: 2021-07-01

## 2021-07-01 ASSESSMENT — PAIN SCALES - GENERAL: PAINLEVEL: MILD PAIN (3)

## 2021-07-01 NOTE — NURSING NOTE
Chief Complaint   Patient presents with     Follow Up     left kidney stone      Patient presents to the clinic today for a follow up for left kidney stone.     Review of Systems:    Weight loss:    No     Recent fever/chills:  No   Night sweats:   No  Current skin rash:  No   Recent hair loss:  No  Heat intolerance:  No   Cold intolerance:  No  Chest pain:   No   Palpitations:   No  Shortness of breath:  No   Wheezing:   No  Constipation:    No   Diarrhea:   No   Nausea:   No   Vomiting:   No   Kidney/side pain:  Yes   Back pain:   No  Frequent headaches:  No   Dizziness:     No  Leg swelling:   No   Calf pain:    No    Medication Reconciliation: completed   Kaya Mena LPN  7/1/2021 1:56 PM

## 2021-07-01 NOTE — PROGRESS NOTES
Type of Visit  EST    Chief Complaint  Ureteral stone  History of recurring kidney stones    HPI  Mr. Murrell is a 46 year old male who follows up after a second visit to the emergency department in the past 2 months for left-sided flank pain.  In April the patient was seen in the emergency department with left flank pain and a CT scan revealed a proximal 2 mm stone.  The patient's pain resolved within a few days.  The patient represented to the emergency room yesterday with recurrent left flank pain.  At that time a CT scan revealed a distal small punctate stone that appeared to be obstructing near the UVJ.  The patient is currently doing well in regards to pain.    Patient follows up with persistent pain.  Pain medication works for him well.  He estimates that he takes approximately 1 to 2 tablets/day on average of [place].  Most recent imaging did reveal persistent stone.      Review of Systems  I personally reviewed the ROS with the patient.    Nursing Notes:   Kaya Mena LPN  7/1/2021  2:01 PM  Addendum  Chief Complaint   Patient presents with     Follow Up     left kidney stone      Patient presents to the clinic today for a follow up for left kidney stone.     Review of Systems:    Weight loss:    No     Recent fever/chills:  No   Night sweats:   No  Current skin rash:  No   Recent hair loss:  No  Heat intolerance:  No   Cold intolerance:  No  Chest pain:   No   Palpitations:   No  Shortness of breath:  No   Wheezing:   No  Constipation:    No   Diarrhea:   No   Nausea:   No   Vomiting:   No   Kidney/side pain:  Yes   Back pain:   No  Frequent headaches:  No   Dizziness:     No  Leg swelling:   No   Calf pain:    No    Medication Reconciliation: completed   Kaya Mena LPN  7/1/2021 1:56 PM       Physical Exam  Vitals:    07/01/21 1359   BP: 130/82   BP Location: Right arm   Patient Position: Sitting   Cuff Size: Adult Large   Pulse: 96   Resp: 16   SpO2: 96%   Weight: 110 kg (242 lb 6.4 oz)    Constitutional: No acute distress.  Alert and cooperative   Head: NCAT  Eyes: Conjunctivae normal  Cardiovascular: Regular rate.  Pulmonary/Chest: Respirations are even and non-labored bilaterally, no audible wheezing  Abdominal: Soft. No distension, tenderness, masses or guarding.   Neurological: A + O x 3.  Cranial Nerves II-XII grossly intact.  Extremities: BEAU x 4, Warm. No clubbing.  No cyanosis.    Skin: Pink, warm and dry.  No visible rashes noted.  Psychiatric:  Normal mood and affect  Back:  - left CVA tenderness.  - right CVA tenderness.  Genitourinary: nonpalpable bladder    Labs  Results for FIDENCIO SOLOMON (MRN [id number]) as of 6/16/2021 14:42   6/12/2021 16:56   Sodium 136   Potassium 3.7   Chloride 101   Carbon Dioxide 25   Urea Nitrogen 16   Creatinine 1.06   GFR Estimate 76   GFR Estimate If Black >90   Calcium 9.1   Anion Gap 10   Albumin 4.2   Protein Total 6.7   Bilirubin Total 0.9   Alkaline Phosphatase 48   ALT 41   AST 23   Lipase 25   Glucose 290 (H)   WBC 8.7   Hemoglobin 16.7   Hematocrit 46.9   Platelet Count 228   RBC Count 5.54   MCV 85   MCH 30.1   MCHC 35.6   RDW 12.1   Diff Method Automated Method   % Neutrophils 76.9   % Lymphocytes 15.1   % Monocytes 6.7   % Eosinophils 0.8   % Basophils 0.2   % Immature Granulocytes 0.3   Absolute Neutrophil 6.7   Absolute Lymphocytes 1.3   Absolute Monocytes 0.6   Absolute Eosinophils 0.1   Absolute Basophils 0.0   Abs Immature Granulocytes 0.0       Results for FIDENCIO SOLOMON (MRN [id number]) as of 4/29/2021 12:51   4/19/2021 19:35 4/19/2021 21:05   Sodium  136   Potassium  4.2   Chloride  106   Carbon Dioxide  22   Urea Nitrogen  17   Creatinine  0.66   GFR Estimate  >90   GFR Estimate If Black  >90   Calcium  8.5   Anion Gap  8   Albumin  3.8   Protein Total  7.7   Bilirubin Total  0.6   Alkaline Phosphatase  73   ALT  62   AST  21   Glucose  193 (H)   WBC  9.2   Hemoglobin  17.2   Hematocrit  49.5   Platelet Count  261   RBC Count  5.76    MCV  86   MCH  29.9   MCHC  34.7   RDW  12.6   Diff Method  Automated Method   % Neutrophils  68.8   % Lymphocytes  22.3   % Monocytes  7.0   % Eosinophils  1.2   % Basophils  0.4   % Immature Granulocytes  0.3   Nucleated RBCs  0   Absolute Neutrophil  6.3   Absolute Lymphocytes  2.1   Absolute Monocytes  0.7   Absolute Eosinophils  0.1   Absolute Basophils  0.0   Abs Immature Granulocytes  0.0   Absolute Nucleated RBC  0.0   Color Urine Yellow    Appearance Urine Clear    Glucose Urine 300 (A)    Bilirubin Urine Negative    Ketones Urine 5 (A)    Specific Gravity Urine 1.031    pH Urine 5.5    Protein Albumin Urine 10 (A)    Urobilinogen mg/dL 2.0    Nitrite Urine Negative    Blood Urine Moderate (A)    Leukocyte Esterase Urine Negative    Source Midstream Urine    WBC Urine 3    RBC Urine 20 (H)    Bacteria Urine None (A)    Squamous Epithelial /HPF Urine 0    Mucous Urine Present (A)    Calcium Oxalate Many (A)      Imaging  CT Stone Study   6/12/2021  IMPRESSION:   Mild left hydroureteronephrosis due to an obstructing distal 2 mm left ureteric   calculus approximately 1.3 cm from the ureterovesicular junction.     ^^^^^^^^^^^^^^^^^^^^^^^^^^^^^^^^^^^^^^^^^^^    CT Stone Study  4/19/2021  I personally reviewed and interpreted the images and report.  Impression: 2 mm proximal left ureteral calculus, at the ureteropelvic  junction with no significant hydronephrosis .    Assessment & Plan  Mr. Murrell is a 46 year old male who presents with symptomatic left  ureteral stone.  Reviewed past notes, labs and imaging.    Discussed risks and benefits of the treatment options for the ureteral stone such as trial of passage, treatment with ureteroscopy or ESWL and the patient elected to proceed with a trial of passage.    Explained to patient to return to ED, or call the urology office if during office hours, if having flu like symptoms, fevers over 101, intractable nausea/vomiting, intractable pain not controlled by pain  medications.    Norco #10 -I explained this is the last pain prescription I can prescribe for this stone event

## 2022-01-29 ENCOUNTER — HEALTH MAINTENANCE LETTER (OUTPATIENT)
Age: 47
End: 2022-01-29

## 2022-09-17 ENCOUNTER — HEALTH MAINTENANCE LETTER (OUTPATIENT)
Age: 47
End: 2022-09-17

## 2023-05-06 ENCOUNTER — HEALTH MAINTENANCE LETTER (OUTPATIENT)
Age: 48
End: 2023-05-06

## 2024-07-13 ENCOUNTER — HEALTH MAINTENANCE LETTER (OUTPATIENT)
Age: 49
End: 2024-07-13

## (undated) RX ORDER — ONDANSETRON 2 MG/ML
INJECTION INTRAMUSCULAR; INTRAVENOUS
Status: DISPENSED
Start: 2021-06-12

## (undated) RX ORDER — SODIUM CHLORIDE 9 MG/ML
INJECTION, SOLUTION INTRAVENOUS
Status: DISPENSED
Start: 2021-06-12

## (undated) RX ORDER — TAMSULOSIN HYDROCHLORIDE 0.4 MG/1
CAPSULE ORAL
Status: DISPENSED
Start: 2021-06-12

## (undated) RX ORDER — OXYCODONE HYDROCHLORIDE 5 MG/1
TABLET ORAL
Status: DISPENSED
Start: 2021-06-12

## (undated) RX ORDER — KETOROLAC TROMETHAMINE 15 MG/ML
INJECTION, SOLUTION INTRAMUSCULAR; INTRAVENOUS
Status: DISPENSED
Start: 2021-06-12